# Patient Record
Sex: MALE | Race: WHITE | NOT HISPANIC OR LATINO | Employment: FULL TIME | URBAN - METROPOLITAN AREA
[De-identification: names, ages, dates, MRNs, and addresses within clinical notes are randomized per-mention and may not be internally consistent; named-entity substitution may affect disease eponyms.]

---

## 2017-01-05 PROBLEM — K57.30 DIVERTICULOSIS OF LARGE INTESTINE WITHOUT HEMORRHAGE: Status: ACTIVE | Noted: 2017-01-05

## 2017-01-10 PROBLEM — K63.5 COLONIC POLYP: Status: ACTIVE | Noted: 2017-01-10

## 2017-02-27 ENCOUNTER — OFFICE VISIT (OUTPATIENT)
Dept: MEDICAL GROUP | Age: 57
End: 2017-02-27
Payer: COMMERCIAL

## 2017-02-27 VITALS
SYSTOLIC BLOOD PRESSURE: 128 MMHG | HEIGHT: 69 IN | OXYGEN SATURATION: 96 % | BODY MASS INDEX: 25.92 KG/M2 | DIASTOLIC BLOOD PRESSURE: 86 MMHG | TEMPERATURE: 97 F | HEART RATE: 96 BPM | WEIGHT: 175 LBS

## 2017-02-27 DIAGNOSIS — J01.40 ACUTE NON-RECURRENT PANSINUSITIS: ICD-10-CM

## 2017-02-27 PROCEDURE — 99214 OFFICE O/P EST MOD 30 MIN: CPT | Performed by: INTERNAL MEDICINE

## 2017-02-27 RX ORDER — FLUTICASONE PROPIONATE 50 MCG
2 SPRAY, SUSPENSION (ML) NASAL DAILY
Qty: 16 G | Refills: 1 | Status: SHIPPED | OUTPATIENT
Start: 2017-02-27 | End: 2019-04-29

## 2017-02-27 RX ORDER — AMOXICILLIN AND CLAVULANATE POTASSIUM 875; 125 MG/1; MG/1
1 TABLET, FILM COATED ORAL 2 TIMES DAILY
Qty: 20 TAB | Refills: 0 | Status: SHIPPED | OUTPATIENT
Start: 2017-02-27 | End: 2019-04-29

## 2017-02-27 ASSESSMENT — PAIN SCALES - GENERAL: PAINLEVEL: NO PAIN

## 2017-02-27 NOTE — PROGRESS NOTES
"Subjective:   Nasir Reid is a 56 y.o. male here today for evaluation and management of:      Acute non-recurrent pansinusitis  Patient reported that he has runny nose started 2 weeks ago that he started notice mucus from the nose turned to greenish last Friday. He reported subjective fever with chills, sinus congestion, sinus pressure, pain on the both cheekbones and frontal bone for one week. He tried over-the-counter NyQuil and Vicks without improvement. He reported postnasal drip with scratchy throat but denied cough, short of breath, wheezing or chest congestion.         Current medicines (including changes today)  Current Outpatient Prescriptions   Medication Sig Dispense Refill   • amoxicillin-clavulanate (AUGMENTIN) 875-125 MG Tab Take 1 Tab by mouth 2 times a day. 20 Tab 0   • fluticasone (FLONASE) 50 MCG/ACT nasal spray Spray 2 Sprays in nose every day. 16 g 1   • sildenafil citrate (VIAGRA) 100 MG tablet Take 0.5 tablet 30 minutes before sex. Max 100 mg per day. 10 Tab 2   • Omega-3 Fatty Acids (FISH OIL) 1000 MG Cap capsule Take 1,000 mg by mouth 3 times a day, with meals.     • Multiple Vitamins-Minerals (MULTI FOR HIM 50+ PO) Take  by mouth.       No current facility-administered medications for this visit.     He  has no past medical history on file.    ROS   No chest pain, no shortness of breath, no abdominal pain. Otherwise see HPI for details.        Objective:     Blood pressure 128/86, pulse 96, temperature 36.1 °C (97 °F), height 1.74 m (5' 8.5\"), weight 79.379 kg (175 lb), SpO2 96 %. Body mass index is 26.22 kg/(m^2).   Physical Exam:  General: Alert, oriented and no acute distress.  Eye contact is good, speech goal directed, affect calm  HEENT: conjunctiva non-injected, sclera non-icteric. Tenderness on palpation of frontal sinuses and cheeks. Swelling and erythema on nasal turbinates of bilateral nasal cavities with greenish yellow mucous discharge.   Oral mucous membranes pink and moist " with no lesions.  Pinna normal. TM pearly gray.   Neck No supraclavicular, submandibular, submental lymphadenopathy or masses in the neck or supraclavicular regions.  Lungs: Normal respiratory effort, clear to auscultation bilaterally with good excursion.  CV: regular rate and rhythm. No murmurs.  Abdomen: soft, non distended, nontender, Bowel sound normal.  Ext: no edema, color normal, vascularity normal, temperature normal        Assessment and Plan:   The following treatment plan was discussed     1. Acute non-recurrent pansinusitis  - Prescribed Augmentin to take as instructed. Recommend to take probiotic with antibiotic. Reviewed the side effects of Augmentin with patient.  - Discussed at length to rinse sinuses with over the counter nasal wash or Netti pot once or twice a day and then use flonase nasal spray once or twice a day after rinsing flonase nasal spray.  - Advised to try nasal steam therapy. Showed picture and discussed with patient how to do nasal steam therapy.   - Recommend to do warm salt water gargling 3 times a day.   - Advised to increase fluid intake.  - amoxicillin-clavulanate (AUGMENTIN) 875-125 MG Tab; Take 1 Tab by mouth 2 times a day.  Dispense: 20 Tab; Refill: 0  - fluticasone (FLONASE) 50 MCG/ACT nasal spray; Spray 2 Sprays in nose every day.  Dispense: 16 g; Refill: 1      - Discussed ED precautions with patient: seek emergency evaluation for symptoms including but not limited to: worsening symptoms or developing any new symptoms, crushing chest pain, chest pain associated with difficulty breathing, nausea, or sweats, heart rate irregular or too fast to count.   - Any change or worsening of signs or symptoms, patient encouraged to follow-up or report to emergency room for further evaluation. Patient understands and agrees      Followup: Return if symptoms worsen or fail to improve.      Please note that this dictation was created using voice recognition software. I have made every  reasonable attempt to correct obvious errors, but I expect that there may have unintended errors in text, spelling, punctuation, or grammar that I did not discover.

## 2017-02-27 NOTE — ASSESSMENT & PLAN NOTE
Patient reported that he has runny nose started 2 weeks ago that he started notice mucus from the nose turned to greenish last Friday. He reported subjective fever with chills, sinus congestion, sinus pressure, pain on the both cheekbones and frontal bone for one week. He tried over-the-counter NyQuil and Vicks without improvement. He reported postnasal drip with scratchy throat but denied cough, short of breath, wheezing or chest congestion.

## 2017-02-27 NOTE — MR AVS SNAPSHOT
"        Nasir Reid   2017 1:20 PM   Office Visit   MRN: [id number]    Department:  [address] [place] [address]   Dept Phone:  [phone]    Description:  Male : 1960   Provider:  Claudia Ruiz M.D.           Reason for Visit     Sinus Problem x 1 week, muscus been green since friday      Allergies as of 2017     No Known Allergies      You were diagnosed with     Acute non-recurrent pansinusitis   [[id number]]         Vital Signs     Blood Pressure Pulse Temperature Height Weight Body Mass Index    128/86 mmHg 96 36.1 °C (97 °F) 1.74 m (5' 8.5\") 79.379 kg (175 lb) 26.22 kg/m2    Oxygen Saturation Smoking Status                96% Current Every Day Smoker          Basic Information     Date Of Birth Sex Race Ethnicity Preferred Language    1960 Male White Non- English      Your appointments     2017  3:00 PM   Established Patient with Shea Carr M.D.   [address] [employer]  [facility] [id number] [phone]           You will be receiving a confirmation call a few days before your appointment from our automated call confirmation system.              Problem List              ICD-10-CM Priority Class Noted - Resolved    Ingrown toenail without infection L60.0   11/10/2016 - Present    Benign prostatic hyperplasia with lower urinary tract symptoms N40.1   11/10/2016 - Present    Erectile dysfunction N52.9   11/10/2016 - Present    Mixed hyperlipidemia E78.2   11/15/2016 - Present    Prediabetes R73.03   11/15/2016 - Present    Diverticulosis of sigmoid colon without hemorrhage, severe K57.30   2017 - Present    Colonic polyp K63.5   1/10/2017 - Present    Acute non-recurrent pansinusitis J01.40   2017 - Present      Health Maintenance        Date Due Completion Dates    IMM DTaP/Tdap/Td Vaccine (1 - Tdap) 3/4/1981 ---    IMM INFLUENZA (1) 2016 ---    COLONOSCOPY 2027            Current Immunizations     No " immunizations on file.      Below and/or attached are the medications your provider expects you to take. Review all of your home medications and newly ordered medications with your provider and/or pharmacist. Follow medication instructions as directed by your provider and/or pharmacist. Please keep your medication list with you and share with your provider. Update the information when medications are discontinued, doses are changed, or new medications (including over-the-counter products) are added; and carry medication information at all times in the event of emergency situations     Allergies:  No Known Allergies          Medications  Valid as of: March 16, 2017 -  1:18 PM    Generic Name Brand Name Tablet Size Instructions for use    Amoxicillin-Pot Clavulanate (Tab) AUGMENTIN 875-125 MG Take 1 Tab by mouth 2 times a day.        Fluticasone Propionate (Suspension) FLONASE 50 MCG/ACT Spray 2 Sprays in nose every day.        Multiple Vitamins-Minerals   Take  by mouth.        Omega-3 Fatty Acids (Cap) fish oil 1000 MG Take 1,000 mg by mouth 3 times a day, with meals.        Sildenafil Citrate (Tab) VIAGRA 100 MG Take 0.5 tablet 30 minutes before sex. Max 100 mg per day.        .                 Medicines prescribed today were sent to:     [facility] #[id number] - [place], [place] - [address]    [address] [place] [id number]    Phone: [phone] Fax: [phone]    Open 24 Hours?: No      Medication refill instructions:       If your prescription bottle indicates you have medication refills left, it is not necessary to call your provider’s office. Please contact your pharmacy and they will refill your medication.    If your prescription bottle indicates you do not have any refills left, you may request refills at any time through one of the following ways: The online [employer] system (except Urgent Care), by calling your provider’s office, or by asking your pharmacy to contact your provider’s office with a refill  request. Medication refills are processed only during regular business hours and may not be available until the next business day. Your provider may request additional information or to have a follow-up visit with you prior to refilling your medication.   *Please Note: Medication refills are assigned a new Rx number when refilled electronically. Your pharmacy may indicate that no refills were authorized even though a new prescription for the same medication is available at the pharmacy. Please request the medicine by name with the pharmacy before contacting your provider for a refill.           MyChart Status: Patient Declined        Quit Tobacco Information     Do you want to quit using tobacco?    Quitting tobacco decreases risks of cancer, heart and lung disease, increases life expectancy, improves sense of taste and smell, and increases spending money, among other benefits.    If you are thinking about quitting, we can help.  • [employer] Quit Tobacco Program: 172.141.3963  o Program occurs weekly for four weeks and includes pharmacist consultation on products to support quitting smoking or chewing tobacco. A provider referral is needed for pharmacist consultation.  • Tobacco Users Help Hotline: 0-832-QUIT-NOW (866-1612) or https://[place].quitlogix.org/  o Free, confidential telephone and online coaching for [place] residents. Sessions are designed on a schedule that is convenient for you. Eligible clients receive free nicotine replacement therapy.  • Nationally: www.smokefree.gov  o Information and professional assistance to support both immediate and long-term needs as you become, and remain, a non-smoker. Smokefree.gov allows you to choose the help that best fits your needs.

## 2018-09-29 ENCOUNTER — APPOINTMENT (OUTPATIENT)
Dept: RADIOLOGY | Facility: IMAGING CENTER | Age: 58
End: 2018-09-29
Attending: PHYSICIAN ASSISTANT
Payer: COMMERCIAL

## 2018-09-29 ENCOUNTER — OFFICE VISIT (OUTPATIENT)
Dept: URGENT CARE | Facility: PHYSICIAN GROUP | Age: 58
End: 2018-09-29
Payer: COMMERCIAL

## 2018-09-29 VITALS
HEART RATE: 86 BPM | WEIGHT: 175 LBS | SYSTOLIC BLOOD PRESSURE: 134 MMHG | OXYGEN SATURATION: 98 % | BODY MASS INDEX: 25.92 KG/M2 | RESPIRATION RATE: 16 BRPM | TEMPERATURE: 98.6 F | HEIGHT: 69 IN | DIASTOLIC BLOOD PRESSURE: 84 MMHG

## 2018-09-29 DIAGNOSIS — S69.91XA FINGER INJURY, RIGHT, INITIAL ENCOUNTER: ICD-10-CM

## 2018-09-29 DIAGNOSIS — S62.662B OPEN NONDISPLACED FRACTURE OF DISTAL PHALANX OF RIGHT MIDDLE FINGER, INITIAL ENCOUNTER: Primary | ICD-10-CM

## 2018-09-29 PROCEDURE — 12002 RPR S/N/AX/GEN/TRNK2.6-7.5CM: CPT | Performed by: PHYSICIAN ASSISTANT

## 2018-09-29 PROCEDURE — 73140 X-RAY EXAM OF FINGER(S): CPT | Mod: TC,RT | Performed by: PHYSICIAN ASSISTANT

## 2018-09-29 PROCEDURE — 90471 IMMUNIZATION ADMIN: CPT | Performed by: PHYSICIAN ASSISTANT

## 2018-09-29 PROCEDURE — 99214 OFFICE O/P EST MOD 30 MIN: CPT | Mod: 25 | Performed by: PHYSICIAN ASSISTANT

## 2018-09-29 PROCEDURE — 90715 TDAP VACCINE 7 YRS/> IM: CPT | Performed by: PHYSICIAN ASSISTANT

## 2018-09-29 RX ORDER — CEPHALEXIN 500 MG/1
500 CAPSULE ORAL 4 TIMES DAILY
Qty: 40 CAP | Refills: 0 | Status: SHIPPED | OUTPATIENT
Start: 2018-09-29 | End: 2018-10-09

## 2018-09-29 RX ORDER — HYDROCODONE BITARTRATE AND ACETAMINOPHEN 5; 325 MG/1; MG/1
1-2 TABLET ORAL EVERY 4 HOURS PRN
Qty: 20 TAB | Refills: 0 | Status: SHIPPED | OUTPATIENT
Start: 2018-09-29 | End: 2018-10-04

## 2018-09-30 ENCOUNTER — OFFICE VISIT (OUTPATIENT)
Dept: URGENT CARE | Facility: PHYSICIAN GROUP | Age: 58
End: 2018-09-30
Payer: COMMERCIAL

## 2018-09-30 VITALS
BODY MASS INDEX: 25.92 KG/M2 | WEIGHT: 175 LBS | TEMPERATURE: 98.8 F | RESPIRATION RATE: 15 BRPM | DIASTOLIC BLOOD PRESSURE: 76 MMHG | OXYGEN SATURATION: 97 % | SYSTOLIC BLOOD PRESSURE: 122 MMHG | HEIGHT: 69 IN | HEART RATE: 72 BPM

## 2018-09-30 DIAGNOSIS — Z51.89 VISIT FOR WOUND CHECK: ICD-10-CM

## 2018-09-30 PROCEDURE — 99024 POSTOP FOLLOW-UP VISIT: CPT | Performed by: FAMILY MEDICINE

## 2018-09-30 NOTE — PROGRESS NOTES
Wound check right 3rd finger laceration and tuft fracture. Minimal bleeding. Pain significant, taking a few norco with some relief. No drainage.      Wound: irrigated and redressed. Sutures in place.     A/P  Wound check, f/u follow-up in 2 days for another recheck.

## 2018-10-01 ASSESSMENT — ENCOUNTER SYMPTOMS
FOCAL WEAKNESS: 0
TINGLING: 0
SENSORY CHANGE: 0

## 2018-10-01 NOTE — PROGRESS NOTES
Subjective:      Nasir Reid is a 58 y.o. male who presents with Laceration (left hand third finger)    PMH: Reviewed with patient/family member/EPIC.   MEDS:   Current Outpatient Prescriptions:   •  cephALEXin (KEFLEX) 500 MG Cap, Take 1 Cap by mouth 4 times a day for 10 days., Disp: 40 Cap, Rfl: 0  •  HYDROcodone-acetaminophen (NORCO) 5-325 MG Tab per tablet, Take 1-2 Tabs by mouth every four hours as needed for up to 5 days. No Driving or alcohol with medication given., Disp: 20 Tab, Rfl: 0  •  amoxicillin-clavulanate (AUGMENTIN) 875-125 MG Tab, Take 1 Tab by mouth 2 times a day., Disp: 20 Tab, Rfl: 0  •  fluticasone (FLONASE) 50 MCG/ACT nasal spray, Spray 2 Sprays in nose every day., Disp: 16 g, Rfl: 1  •  sildenafil citrate (VIAGRA) 100 MG tablet, Take 0.5 tablet 30 minutes before sex. Max 100 mg per day., Disp: 10 Tab, Rfl: 2  •  Omega-3 Fatty Acids (FISH OIL) 1000 MG Cap capsule, Take 1,000 mg by mouth 3 times a day, with meals., Disp: , Rfl:   •  Multiple Vitamins-Minerals (MULTI FOR HIM 50+ PO), Take  by mouth., Disp: , Rfl:   ALLERGIES: No Known Allergies  SURGHX:   Past Surgical History:   Procedure Laterality Date   • APPENDECTOMY  1975     SOCHX:  reports that he has been smoking Cigarettes.  He has a 5.00 pack-year smoking history. He has never used smokeless tobacco. He reports that he drinks about 9.0 oz of alcohol per week . He reports that he does not use drugs.  FH:  Reviewed with patient/family. Not pertinent to this complaint.          Patient presents with:  Laceration: right hand third finger on a fan blade (boat engine)          Laceration    The incident occurred less than 1 hour ago. Pain location: right middle finger. The laceration is 4 cm in size. The laceration mechanism was a metal edge. The pain is at a severity of 8/10. The pain has been constant since onset. He reports no foreign bodies present. His tetanus status is out of date.       Review of Systems   Neurological: Negative  "for tingling, sensory change and focal weakness.   All other systems reviewed and are negative.         Objective:     /84 (BP Location: Right arm, Patient Position: Sitting, BP Cuff Size: Adult)   Pulse 86   Temp 37 °C (98.6 °F) (Temporal)   Resp 16   Ht 1.74 m (5' 8.5\")   Wt 79.4 kg (175 lb)   SpO2 98%   BMI 26.22 kg/m²      Physical Exam   Constitutional: He is oriented to person, place, and time. He appears well-developed and well-nourished. No distress.   HENT:   Head: Normocephalic and atraumatic.   Nose: Nose normal.   Eyes: Pupils are equal, round, and reactive to light. Conjunctivae and EOM are normal.   Neck: Normal range of motion. Neck supple. No JVD present.   Cardiovascular: Normal rate and intact distal pulses.    Pulmonary/Chest: Effort normal.   Abdominal: Soft.   Musculoskeletal:        Right hand: He exhibits tenderness, laceration and swelling. He exhibits normal range of motion, normal capillary refill and no deformity. Normal sensation noted.        Hands:  Macerated, stellate laceration to distal aspect of middle finger.  Some soft tissue avulsion with damage to nail.  No FB noted.    Lymphadenopathy:     He has no cervical adenopathy.   Neurological: He is alert and oriented to person, place, and time.   Skin: Skin is warm and dry. Capillary refill takes less than 2 seconds.   Nursing note and vitals reviewed.    Xray images viewed and interpreted by me, confirmed by radiology:      Impression       Acute comminuted fracture of the distal tuft of the distal phalanx of the right 3rd finger.   Reading Provider Reading Date   Wild Medel M.D. Sep 29, 2018   Signing Provider Signing Date Signing Time   Wild Medel M.D. Sep 29, 2018  1:49 PM          Procedure: Laceration Repair  -Risks including bleeding, nerve damage, infection, and poor cosmetic outcome discussed at length. Benefits and alternatives discussed.   -Sterile technique throughout  -Local anesthesia with 2% " lidocaine  -Closed with #12,  4 -0 Nylon interrupted sutures with good wound approximation.  2 sutures placed through the fingernail to secure tissue.   -Polysporin and dressing placed  -Patient tolerated well           Assessment/Plan:     1. Open nondisplaced fracture of distal phalanx of right middle finger, initial encounter  cephALEXin (KEFLEX) 500 MG Cap    HYDROcodone-acetaminophen (NORCO) 5-325 MG Tab per tablet    Consent for Opiate Prescription   2. Finger injury, right, initial encounter  DX-FINGER(S) 2+ RIGHT    Tdap =>8yo IM    cephALEXin (KEFLEX) 500 MG Cap    HYDROcodone-acetaminophen (NORCO) 5-325 MG Tab per tablet    Consent for Opiate Prescription   3. Laceration  DX-FINGER(S) 2+ RIGHT    Tdap =>8yo IM    cephALEXin (KEFLEX) 500 MG Cap    HYDROcodone-acetaminophen (NORCO) 5-325 MG Tab per tablet    Consent for Opiate Prescription     PT to return tomorrow for wound check and dressing change.      PT should follow up with PCP in 1-2 days for re-evaluation if symptoms have not improved.  Discussed red flags and reasons to return to UC or ED.  Pt and/or family verbalized understanding of diagnosis and follow up instructions and was offered informational handout on diagnosis.  PT discharged.

## 2018-10-02 ENCOUNTER — OFFICE VISIT (OUTPATIENT)
Dept: URGENT CARE | Facility: CLINIC | Age: 58
End: 2018-10-02
Payer: COMMERCIAL

## 2018-10-02 VITALS
BODY MASS INDEX: 25.92 KG/M2 | OXYGEN SATURATION: 97 % | SYSTOLIC BLOOD PRESSURE: 144 MMHG | WEIGHT: 175 LBS | HEART RATE: 68 BPM | RESPIRATION RATE: 16 BRPM | HEIGHT: 69 IN | TEMPERATURE: 97 F | DIASTOLIC BLOOD PRESSURE: 100 MMHG

## 2018-10-02 DIAGNOSIS — Z51.89 VISIT FOR WOUND CHECK: Primary | ICD-10-CM

## 2018-10-02 PROCEDURE — 99024 POSTOP FOLLOW-UP VISIT: CPT | Performed by: PHYSICIAN ASSISTANT

## 2018-10-02 NOTE — PATIENT INSTRUCTIONS
Suture Removal, Care After  Refer to this sheet in the next few weeks. These instructions provide you with information on caring for yourself after your procedure. Your health care provider may also give you more specific instructions. Your treatment has been planned according to current medical practices, but problems sometimes occur. Call your health care provider if you have any problems or questions after your procedure.  WHAT TO EXPECT AFTER THE PROCEDURE  After your stitches (sutures) are removed, it is typical to have the following:  · Some discomfort and swelling in the wound area.  · Slight redness in the area.  HOME CARE INSTRUCTIONS   · If you have skin adhesive strips over the wound area, do not take the strips off. They will fall off on their own in a few days. If the strips remain in place after 14 days, you may remove them.  · Change any bandages (dressings) at least once a day or as directed by your health care provider. If the bandage sticks, soak it off with warm, soapy water.  · Apply cream or ointment only as directed by your health care provider. If using cream or ointment, wash the area with soap and water 2 times a day to remove all the cream or ointment. Rinse off the soap and pat the area dry with a clean towel.  · Keep the wound area dry and clean. If the bandage becomes wet or dirty, or if it develops a bad smell, change it as soon as possible.  · Continue to protect the wound from injury.  · Use sunscreen when out in the sun. New scars become sunburned easily.  SEEK MEDICAL CARE IF:  · You have increasing redness, swelling, or pain in the wound.  · You see pus coming from the wound.  · You have a fever.  · You notice a bad smell coming from the wound or dressing.  · Your wound breaks open (edges not staying together).     This information is not intended to replace advice given to you by your health care provider. Make sure you discuss any questions you have with your health care  provider.     Document Released: 09/12/2002 Document Revised: 10/08/2014 Document Reviewed: 07/30/2014  [employer] Interactive Patient Education ©2016 [employer]

## 2018-10-02 NOTE — PROGRESS NOTES
HPI:   Presents for wound check 3 days post procedure/ sutured lac. Wound with mild pain but no redness, minimal discharge noted. Sutures in place.    ROS:    no fever, no sensory loss, no weakness    Exam:    Gen: WDWN in no distress  Wound: well healing wound. . No evidence of dehiscence or infection.  Neuro: sensory/motor intact     A/P    Laceration wound check  F/U in 2d        DRESSING CHANGE:    Dressing removed with trace drainage    Wound irrigated with NS    Dressing applied    Pt tolerated procedure well

## 2018-10-07 ENCOUNTER — OFFICE VISIT (OUTPATIENT)
Dept: URGENT CARE | Facility: CLINIC | Age: 58
End: 2018-10-07
Payer: COMMERCIAL

## 2018-10-07 VITALS
DIASTOLIC BLOOD PRESSURE: 80 MMHG | SYSTOLIC BLOOD PRESSURE: 116 MMHG | WEIGHT: 175 LBS | HEART RATE: 78 BPM | BODY MASS INDEX: 25.92 KG/M2 | TEMPERATURE: 98.2 F | HEIGHT: 69 IN | OXYGEN SATURATION: 96 % | RESPIRATION RATE: 16 BRPM

## 2018-10-07 DIAGNOSIS — Z48.02 VISIT FOR SUTURE REMOVAL: ICD-10-CM

## 2018-10-07 ASSESSMENT — ENCOUNTER SYMPTOMS
CONSTITUTIONAL NEGATIVE: 1
MUSCULOSKELETAL NEGATIVE: 1
FOCAL WEAKNESS: 0
JOINT SWELLING: 0
NUMBNESS: 1
WEAKNESS: 0
SENSORY CHANGE: 1

## 2018-10-07 NOTE — PROGRESS NOTES
"Subjective:      Nasir Reid is a 58 y.o. male who presents with Suture / Staple Removal            Other   This is a new (s/p fingertip sut 8d; healing well; no redn/soreness; needs sut out because leaving town in 2d) problem. The current episode started in the past 7 days. The problem occurs constantly. The problem has been rapidly improving. Associated symptoms include numbness. Pertinent negatives include no joint swelling or weakness. Nothing aggravates the symptoms. He has tried rest for the symptoms. The treatment provided significant relief.       Review of Systems   Constitutional: Negative.    Musculoskeletal: Negative.  Negative for joint swelling.   Skin: Negative.    Neurological: Positive for sensory change (little fingertip numbness ) and numbness. Negative for focal weakness and weakness.          Objective:     /80 (BP Location: Left arm, Patient Position: Sitting, BP Cuff Size: Adult)   Pulse 78   Temp 36.8 °C (98.2 °F)   Resp 16   Ht 1.74 m (5' 8.5\")   Wt 79.4 kg (175 lb)   SpO2 96%   BMI 26.22 kg/m²      Physical Exam   Constitutional: He is oriented to person, place, and time. He appears well-developed and well-nourished. No distress.   Musculoskeletal: Normal range of motion. He exhibits no edema or tenderness.   sut area well healed; sut removed w/o complic   Neurological: He is alert and oriented to person, place, and time. A sensory deficit (little spot numbness) is present. He exhibits normal muscle tone. Coordination normal.   Skin: Skin is warm and dry. Capillary refill takes less than 2 seconds. No erythema.   Psychiatric: He has a normal mood and affect. His behavior is normal.   Nursing note and vitals reviewed.              Assessment/Plan:     ·  sut removal      · Healing well ; only time will tell if that distal skin lac/avuls will fully revascularize; there will no doubt be a little numbish area of skin at the fingertip, but pt will have full function      "

## 2019-01-20 ENCOUNTER — OFFICE VISIT (OUTPATIENT)
Dept: URGENT CARE | Facility: CLINIC | Age: 59
End: 2019-01-20
Payer: COMMERCIAL

## 2019-01-20 VITALS
TEMPERATURE: 97.7 F | RESPIRATION RATE: 16 BRPM | DIASTOLIC BLOOD PRESSURE: 90 MMHG | HEART RATE: 90 BPM | SYSTOLIC BLOOD PRESSURE: 150 MMHG | OXYGEN SATURATION: 96 % | HEIGHT: 70 IN | WEIGHT: 178 LBS | BODY MASS INDEX: 25.48 KG/M2

## 2019-01-20 DIAGNOSIS — J32.9 OTHER SINUSITIS, UNSPECIFIED CHRONICITY: ICD-10-CM

## 2019-01-20 PROCEDURE — 99214 OFFICE O/P EST MOD 30 MIN: CPT | Performed by: PHYSICIAN ASSISTANT

## 2019-01-20 RX ORDER — CEFUROXIME AXETIL 500 MG/1
500 TABLET ORAL 2 TIMES DAILY
Qty: 20 TAB | Refills: 0 | Status: SHIPPED | OUTPATIENT
Start: 2019-01-20 | End: 2019-01-30

## 2019-01-20 ASSESSMENT — ENCOUNTER SYMPTOMS
CONSTITUTIONAL NEGATIVE: 1
CARDIOVASCULAR NEGATIVE: 1
RESPIRATORY NEGATIVE: 1
HEADACHES: 1
SORE THROAT: 0
SINUS PAIN: 1
SINUS PRESSURE: 1
EYES NEGATIVE: 1
COUGH: 0

## 2019-01-20 NOTE — PROGRESS NOTES
"Subjective:      Nasir Reid is a 58 y.o. male who presents with Sinus Problem ([id number], sinus congestion, pain, colored mucus, taste is off)            Sinus Problem   This is a new problem. The current episode started in the past 7 days. The problem is unchanged. There has been no fever. The pain is moderate. Associated symptoms include congestion, headaches and sinus pressure. Pertinent negatives include no coughing or sore throat. Past treatments include nothing. The treatment provided no relief.       Review of Systems   Constitutional: Negative.    HENT: Positive for congestion, sinus pain and sinus pressure. Negative for sore throat.    Eyes: Negative.    Respiratory: Negative.  Negative for cough.    Cardiovascular: Negative.    Skin: Negative.    Neurological: Positive for headaches.   All other systems reviewed and are negative.         Objective:     /90 (BP Location: Left arm, Patient Position: Sitting, BP Cuff Size: Adult)   Pulse 90   Temp 36.5 °C (97.7 °F) (Temporal)   Resp 16   Ht 1.778 m (5' 10\")   Wt 80.7 kg (178 lb)   SpO2 96%   BMI 25.54 kg/m²      Physical Exam   Constitutional: He is oriented to person, place, and time. He appears well-developed and well-nourished. No distress.   HENT:   Head: Normocephalic and atraumatic.   Mouth/Throat: No oropharyngeal exudate.   +maxill.sinus press.  +phar./tons redn       Eyes: Pupils are equal, round, and reactive to light. Conjunctivae and EOM are normal.   Neck: Normal range of motion. Neck supple.   Cardiovascular: Normal rate.    Pulmonary/Chest: Effort normal and breath sounds normal. No respiratory distress.   Lymphadenopathy:     He has cervical adenopathy.   Neurological: He is alert and oriented to person, place, and time.   Skin: Skin is warm and dry. No rash noted.   Psychiatric: He has a normal mood and affect. His behavior is normal.   Nursing note and vitals reviewed.    Active Ambulatory Problems     Diagnosis Date Noted "   • Ingrown toenail without infection 11/10/2016   • Benign prostatic hyperplasia with lower urinary tract symptoms 11/10/2016   • Erectile dysfunction 11/10/2016   • Mixed hyperlipidemia 11/15/2016   • Prediabetes 11/15/2016   • Diverticulosis of sigmoid colon without hemorrhage, severe 01/05/2017   • Colonic polyp 01/10/2017   • Acute non-recurrent pansinusitis 02/27/2017     Resolved Ambulatory Problems     Diagnosis Date Noted   • Foot pain, bilateral 11/10/2016   • Common wart 11/10/2016     No Additional Past Medical History     Current Outpatient Prescriptions on File Prior to Visit   Medication Sig Dispense Refill   • amoxicillin-clavulanate (AUGMENTIN) 875-125 MG Tab Take 1 Tab by mouth 2 times a day. (Patient not taking: Reported on 1/20/2019) 20 Tab 0   • fluticasone (FLONASE) 50 MCG/ACT nasal spray Spray 2 Sprays in nose every day. (Patient not taking: Reported on 1/20/2019) 16 g 1   • sildenafil citrate (VIAGRA) 100 MG tablet Take 0.5 tablet 30 minutes before sex. Max 100 mg per day. (Patient not taking: Reported on 1/20/2019) 10 Tab 2   • Omega-3 Fatty Acids (FISH OIL) 1000 MG Cap capsule Take 1,000 mg by mouth 3 times a day, with meals.     • Multiple Vitamins-Minerals (MULTI FOR HIM 50+ PO) Take  by mouth.       No current facility-administered medications on file prior to visit.      Social History     Social History   • Marital status:      Spouse name: N/A   • Number of children: N/A   • Years of education: N/A     Occupational History   • Not on file.     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              Assessment/Plan:     ·  sinusitis      · Start w/MucinexD; nsaids; [hold rx for abx prn [conditional use] if sx persist/worsen]  ·

## 2019-01-29 ENCOUNTER — TELEPHONE (OUTPATIENT)
Dept: URGENT CARE | Facility: CLINIC | Age: 59
End: 2019-01-29

## 2019-01-30 ENCOUNTER — TELEPHONE (OUTPATIENT)
Dept: URGENT CARE | Facility: CLINIC | Age: 59
End: 2019-01-30

## 2019-01-30 DIAGNOSIS — J01.81 OTHER ACUTE RECURRENT SINUSITIS: ICD-10-CM

## 2019-01-30 RX ORDER — CEFUROXIME AXETIL 500 MG/1
500 TABLET ORAL 2 TIMES DAILY
Qty: 20 TAB | Refills: 0 | Status: SHIPPED | OUTPATIENT
Start: 2019-01-30 | End: 2019-02-09

## 2019-04-29 ENCOUNTER — OFFICE VISIT (OUTPATIENT)
Dept: URGENT CARE | Facility: CLINIC | Age: 59
End: 2019-04-29
Payer: COMMERCIAL

## 2019-04-29 VITALS
TEMPERATURE: 97.4 F | HEART RATE: 72 BPM | OXYGEN SATURATION: 96 % | WEIGHT: 180 LBS | HEIGHT: 70 IN | SYSTOLIC BLOOD PRESSURE: 130 MMHG | RESPIRATION RATE: 17 BRPM | DIASTOLIC BLOOD PRESSURE: 90 MMHG | BODY MASS INDEX: 25.77 KG/M2

## 2019-04-29 DIAGNOSIS — J01.00 ACUTE NON-RECURRENT MAXILLARY SINUSITIS: ICD-10-CM

## 2019-04-29 PROCEDURE — 99214 OFFICE O/P EST MOD 30 MIN: CPT | Performed by: PHYSICIAN ASSISTANT

## 2019-04-29 RX ORDER — AMOXICILLIN AND CLAVULANATE POTASSIUM 875; 125 MG/1; MG/1
1 TABLET, FILM COATED ORAL 2 TIMES DAILY
Qty: 20 TAB | Refills: 0 | Status: SHIPPED | OUTPATIENT
Start: 2019-04-29

## 2019-04-29 NOTE — PROGRESS NOTES
"Chief Complaint   Patient presents with   • Sinus Problem     x2wks, started as headcold, congestion, sinus pressure, headache, post nasal drip, yellow mucus       HISTORY OF PRESENT ILLNESS: Patient is a 59 y.o. male who presents today for 2 weeks of worsening sinus congestion/pressure.  Started as a \"head cold\", got bettyer and then worsened and lateralized to the right side. He states that he started using cold medicine initially and Advil does help with the pain. When he leans forward he feels throbbing in the sinus. He is getting discolored drainage out in the last few days. No fevers and does not feel overtly unwell.      Patient Active Problem List    Diagnosis Date Noted   • Acute non-recurrent pansinusitis 2017   • Colonic polyp 01/10/2017   • Diverticulosis of sigmoid colon without hemorrhage, severe 2017   • Mixed hyperlipidemia 11/15/2016   • Prediabetes 11/15/2016   • Ingrown toenail without infection 11/10/2016   • Benign prostatic hyperplasia with lower urinary tract symptoms 11/10/2016   • Erectile dysfunction 11/10/2016       Allergies:Patient has no known allergies.    Current Outpatient Prescriptions Ordered in [facility]   Medication Sig Dispense Refill   • Omega-3 Fatty Acids (FISH OIL) 1000 MG Cap capsule Take 1,000 mg by mouth 3 times a day, with meals.     • Multiple Vitamins-Minerals (MULTI FOR HIM 50+ PO) Take  by mouth.       No current Epic-ordered facility-administered medications on file.        No past medical history on file.    Social History   Substance Use Topics   • Smoking status: Current Every Day Smoker     Packs/day: 0.25     Years: 20.00     Types: Cigarettes   • Smokeless tobacco: Never Used   • Alcohol use 9.0 oz/week     15 Cans of beer per week       Family Status   Relation Status   • Mo  at age 77   • Bro Alive   • Sis Alive   • Sis Alive   • Sis Alive   • Bro Alive   • Bro Alive   • Fa  at age 87   • Sis    • MGMo    • MGFa " "   • PGMo  at age 89   • PGFa    • Bro         MVA     Family History   Problem Relation Age of Onset   • Cancer Mother         pancreatic cancer   • No Known Problems Brother    • No Known Problems Sister    • No Known Problems Sister    • No Known Problems Sister    • No Known Problems Brother    • No Known Problems Brother        ROS:  Review of Systems   Constitutional: SEE HPI.   HENT: SEE HPI  Eyes: Negative for blurred vision.   Respiratory: SEE HPI    Cardiovascular: Negative for chest pain, palpitations, orthopnea and leg swelling.   Gastrointestinal: Negative for heartburn, nausea, vomiting and abdominal pain.     Exam:  /90 (BP Location: Left arm, Patient Position: Sitting, BP Cuff Size: Adult)   Pulse 72   Temp 36.3 °C (97.4 °F) (Temporal)   Resp 17   Ht 1.778 m (5' 10\")   Wt 81.6 kg (180 lb)   SpO2 96%   General:  Well nourished, well developed male in NAD  Eyes: PERRLA, EOM within normal limits, no conjunctival injection, no scleral icterus, visual fields and acuity grossly intact.  Ears: Normal shape and symmetry, no tenderness, no discharge. External canals are without any significant edema or erythema. Tympanic membranes are without any inflammation, no effusion. Gross auditory acuity is intact  Nose: Symmetrical, right maxillary sinus TTP, boggy turbinates with scant purulent rhinorrhea.   Mouth: reasonable hygiene, no erythema exudates or tonsillar enlargement.  Neck: no masses, range of motion within normal limits, no tracheal deviation. No lymphadenopathy  Pulmonary: Normal respiratory effort, no wheezes, crackles, or rhonchi.  Cardiovascular: regular rate and rhythm without murmurs, rubs, or gallops.  Skin: No visible rashes or lesion. Warm, pink, dry.   Extremities: no clubbing, cyanosis, or edema.  Neuro: A&O x 3. Speech normal/clear.  Normal gait.         Assessment/Plan:  1. Acute non-recurrent maxillary sinusitis  amoxicillin-clavulanate " (AUGMENTIN) 875-125 MG Tab         -consistent with unilateral bacterial sinusitis.    -sinus care at home discussed, Flonase/saline  -humidifier/steam inhalations to soothe lungs.   -rx as above.     Supportive care, differential diagnoses, and indications for immediate follow-up discussed with patient.   Pathogenesis of diagnosis discussed including typical length and natural progression.   Instructed to return to clinic or nearest emergency department for any change in condition, further concerns, or worsening of symptoms.  Patient states understanding of the plan of care and discharge instructions.        Shalonda Rivera P.A.-C.